# Patient Record
Sex: FEMALE | URBAN - METROPOLITAN AREA
[De-identification: names, ages, dates, MRNs, and addresses within clinical notes are randomized per-mention and may not be internally consistent; named-entity substitution may affect disease eponyms.]

---

## 2018-07-19 ENCOUNTER — OFFICE VISIT (OUTPATIENT)
Dept: URBAN - METROPOLITAN AREA CLINIC 40 | Facility: CLINIC | Age: 73
End: 2018-07-19
Payer: COMMERCIAL

## 2018-07-19 DIAGNOSIS — M06.09 RA WITHOUT RHEUMATOID FACTOR OF MULTIPLE SITES: Primary | ICD-10-CM

## 2018-07-19 PROCEDURE — 92014 COMPRE OPH EXAM EST PT 1/>: CPT | Performed by: OPTOMETRIST

## 2018-07-19 ASSESSMENT — INTRAOCULAR PRESSURE
OD: 14
OS: 15

## 2018-07-19 NOTE — IMPRESSION/PLAN
Impression: Age-related nuclear cataract, bilateral: H25.13. Plan: Cataracts account for the patient's complaints. No treatment currently recommended. The patient will monitor vision changes and contact us with any decrease in vision. Schedule refraction.

## 2018-07-19 NOTE — IMPRESSION/PLAN
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 Plan: Discussed diagnosis in detail with patient. Will continue to observe condition and or symptoms. No evidence of plaquenil toxicity OU. Schedule refraction.

## 2019-03-15 ENCOUNTER — OFFICE VISIT (OUTPATIENT)
Dept: URBAN - METROPOLITAN AREA CLINIC 29 | Facility: CLINIC | Age: 74
End: 2019-03-15
Payer: MEDICARE

## 2019-03-15 DIAGNOSIS — E11.9 TYPE 2 DIABETES MELLITUS W/O COMPLICATION: ICD-10-CM

## 2019-03-15 DIAGNOSIS — T37.2X5S ADVERSE EFFECT OF ANTIMALARIAL, SEQUELA: Primary | ICD-10-CM

## 2019-03-15 PROCEDURE — 92014 COMPRE OPH EXAM EST PT 1/>: CPT | Performed by: OPTOMETRIST

## 2019-03-15 PROCEDURE — 92134 CPTRZ OPH DX IMG PST SGM RTA: CPT | Performed by: OPTOMETRIST

## 2019-03-15 ASSESSMENT — INTRAOCULAR PRESSURE
OS: 12
OD: 13

## 2019-03-15 NOTE — IMPRESSION/PLAN
Impression: Adverse effect of antimalarial, sequela: T37.2x5S. No Plaquenil related eye Disease OU Plan: Discussed diagnosis in detail with patient. No treatment is required at this time. Use of Red Amsler grid was explained. Reassured patient of current condition and treatment. Will continue to observe condition and or symptoms.

## 2019-03-15 NOTE — IMPRESSION/PLAN
Impression: Type 2 diabetes mellitus w/o complication: H71.5. No Diabetic related eye Disease OU Plan: Discussed diagnosis in detail with patient. Emphasized blood sugar control. Will continue to observe condition and or symptoms. Call if symptoms occur.

## 2019-05-17 ENCOUNTER — OFFICE VISIT (OUTPATIENT)
Dept: URBAN - METROPOLITAN AREA CLINIC 29 | Facility: CLINIC | Age: 74
End: 2019-05-17
Payer: MEDICARE

## 2019-05-17 PROCEDURE — 65778 COVER EYE W/MEMBRANE: CPT | Performed by: OPTOMETRIST

## 2019-05-17 PROCEDURE — 99213 OFFICE O/P EST LOW 20 MIN: CPT | Performed by: OPTOMETRIST

## 2019-05-17 RX ORDER — OFLOXACIN 3 MG/ML
0.3 % SOLUTION/ DROPS OPHTHALMIC
Qty: 1 | Refills: 0 | Status: INACTIVE
Start: 2019-05-17 | End: 2019-05-21

## 2019-05-17 RX ORDER — CYCLOSPORINE 0.5 MG/ML
0.05 % EMULSION OPHTHALMIC
Qty: 90 | Refills: 3 | Status: INACTIVE
Start: 2019-05-17 | End: 2019-05-20

## 2019-05-17 NOTE — IMPRESSION/PLAN
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 Plan: Discussed diagnosis in detail with patient. Discussed treatment options with patient. Discussed risks and benefits and patient understands. Discussed risks of progression. Severity of condition require New medication(s) and application of new Amniotic bandage CL today.

## 2019-05-21 ENCOUNTER — OFFICE VISIT (OUTPATIENT)
Dept: URBAN - METROPOLITAN AREA CLINIC 29 | Facility: CLINIC | Age: 74
End: 2019-05-21
Payer: MEDICARE

## 2019-05-21 PROCEDURE — 99213 OFFICE O/P EST LOW 20 MIN: CPT | Performed by: OPTOMETRIST

## 2019-05-21 RX ORDER — CYCLOSPORINE 0.5 MG/ML
0.05 % EMULSION OPHTHALMIC
Qty: 180 | Refills: 3 | Status: ACTIVE
Start: 2019-05-21

## 2019-05-21 NOTE — IMPRESSION/PLAN
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 OS. Condition: improving. Plan: Discussed diagnosis in detail with patient. Discussed treatment options with patient. Patient instructed to use artificial tears as needed. STELLA HS OU. Will continue to observe condition and or symptoms. New medication(s) Rx given today. Removed CL OS.

## 2019-05-22 ENCOUNTER — OFFICE VISIT (OUTPATIENT)
Dept: URBAN - METROPOLITAN AREA CLINIC 29 | Facility: CLINIC | Age: 74
End: 2019-05-22
Payer: MEDICARE

## 2019-05-22 PROCEDURE — 92071 CONTACT LENS FITTING FOR TX: CPT | Performed by: OPTOMETRIST

## 2019-05-22 PROCEDURE — 99213 OFFICE O/P EST LOW 20 MIN: CPT | Performed by: OPTOMETRIST

## 2019-05-22 RX ORDER — OFLOXACIN 3 MG/ML
0.3 % SOLUTION/ DROPS OPHTHALMIC
Qty: 1 | Refills: 0 | Status: INACTIVE
Start: 2019-05-22 | End: 2019-06-05

## 2019-05-22 NOTE — IMPRESSION/PLAN
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 Condition: established, worsening. Plan: Discussed diagnosis in detail with patient. Discussed treatment options with patient. Discussed risks and benefits and patient understands. Discussed risks of progression. New medication(s) Rx given today. Patient given new bandage CL today.

## 2019-06-05 ENCOUNTER — OFFICE VISIT (OUTPATIENT)
Dept: URBAN - METROPOLITAN AREA CLINIC 29 | Facility: CLINIC | Age: 74
End: 2019-06-05
Payer: MEDICARE

## 2019-06-05 PROCEDURE — 99213 OFFICE O/P EST LOW 20 MIN: CPT | Performed by: OPTOMETRIST

## 2019-06-05 NOTE — IMPRESSION/PLAN
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 Condition: improving. Plan: Discussed diagnosis in detail with patient. Discussed treatment options with patient. Patient instructed to use artificial tears as needed. STELLA HS OU. Will continue to observe condition and or symptoms. Continue using current medication(s). Spine appears normal, range of motion is not limited, no muscle or joint tenderness

## 2020-08-26 ENCOUNTER — NEW PATIENT (OUTPATIENT)
Dept: URBAN - METROPOLITAN AREA CLINIC 10 | Facility: CLINIC | Age: 75
End: 2020-08-26
Payer: MEDICARE

## 2020-08-26 DIAGNOSIS — H25.13 AGE-RELATED NUCLEAR CATARACT, BILATERAL: ICD-10-CM

## 2020-08-26 PROCEDURE — 92002 INTRM OPH EXAM NEW PATIENT: CPT | Performed by: OPHTHALMOLOGY

## 2020-08-26 PROCEDURE — 92071 CONTACT LENS FITTING FOR TX: CPT | Performed by: OPHTHALMOLOGY

## 2020-08-26 PROCEDURE — 68761 CLOSE TEAR DUCT OPENING: CPT | Performed by: OPHTHALMOLOGY

## 2020-10-08 ENCOUNTER — FOLLOW UP ESTABLISHED (OUTPATIENT)
Dept: URBAN - METROPOLITAN AREA CLINIC 24 | Facility: CLINIC | Age: 75
End: 2020-10-08
Payer: MEDICARE

## 2020-10-08 PROCEDURE — 92012 INTRM OPH EXAM EST PATIENT: CPT | Performed by: OPHTHALMOLOGY

## 2020-10-08 PROCEDURE — 92071 CONTACT LENS FITTING FOR TX: CPT | Performed by: OPHTHALMOLOGY

## 2020-11-05 ENCOUNTER — FOLLOW UP ESTABLISHED (OUTPATIENT)
Dept: URBAN - METROPOLITAN AREA CLINIC 24 | Facility: CLINIC | Age: 75
End: 2020-11-05
Payer: MEDICARE

## 2020-11-05 PROCEDURE — 68761 CLOSE TEAR DUCT OPENING: CPT | Performed by: OPHTHALMOLOGY

## 2020-11-05 PROCEDURE — 92012 INTRM OPH EXAM EST PATIENT: CPT | Performed by: OPHTHALMOLOGY

## 2020-11-05 PROCEDURE — 92071 CONTACT LENS FITTING FOR TX: CPT | Performed by: OPHTHALMOLOGY

## 2020-12-17 ENCOUNTER — FOLLOW UP ESTABLISHED (OUTPATIENT)
Dept: URBAN - METROPOLITAN AREA CLINIC 24 | Facility: CLINIC | Age: 75
End: 2020-12-17
Payer: MEDICARE

## 2020-12-17 PROCEDURE — 92012 INTRM OPH EXAM EST PATIENT: CPT | Performed by: OPHTHALMOLOGY

## 2020-12-17 ASSESSMENT — INTRAOCULAR PRESSURE
OS: 7
OD: 6

## 2021-03-11 ENCOUNTER — FOLLOW UP ESTABLISHED (OUTPATIENT)
Dept: URBAN - METROPOLITAN AREA CLINIC 24 | Facility: CLINIC | Age: 76
End: 2021-03-11
Payer: OTHER MISCELLANEOUS

## 2021-03-11 PROCEDURE — 99213 OFFICE O/P EST LOW 20 MIN: CPT | Performed by: OPHTHALMOLOGY

## 2021-07-15 ENCOUNTER — OFFICE VISIT (OUTPATIENT)
Dept: URBAN - METROPOLITAN AREA CLINIC 24 | Facility: CLINIC | Age: 76
End: 2021-07-15
Payer: OTHER MISCELLANEOUS

## 2021-07-15 PROCEDURE — 99213 OFFICE O/P EST LOW 20 MIN: CPT | Performed by: OPHTHALMOLOGY

## 2021-07-15 NOTE — IMPRESSION/PLAN
Impression: Age-related nuclear cataract, bilateral Plan: Visually significant, given recent abrasion will hold off on cat sx for now. 
Will address nv if still quiet

## 2021-07-15 NOTE — IMPRESSION/PLAN
Impression: Filamentary keratitis, left eye
- h/o RA, on infusions of Orencia - PP IN place MAGGIE and LLL Plan: H/o filamentary keratitis OU, s/p amniotic membrane OS in 2019, was unable to tolerate. H/o Prokera on the past per pt by DR. Jenaro Hinds at [facility]. Has not need BCL patching or restasis since 11/2020. Was doing well with SERUM TEARS, however was told by Dr. Jean Certain to stop serum tears and use thera tears; subsequently developed filaments and an abrasion. BCL was placed by her daughter. BCL removed today. Instructed to resume serum tears QID OS. Cont PFATs q2hr w/a. May consider repeat Prokera in the future.

## 2022-04-01 ENCOUNTER — OFFICE VISIT (OUTPATIENT)
Dept: URBAN - METROPOLITAN AREA CLINIC 24 | Facility: CLINIC | Age: 77
End: 2022-04-01
Payer: OTHER MISCELLANEOUS

## 2022-04-01 DIAGNOSIS — H16.122 FILAMENTARY KERATITIS, LEFT EYE: ICD-10-CM

## 2022-04-01 PROCEDURE — 99213 OFFICE O/P EST LOW 20 MIN: CPT | Performed by: OPHTHALMOLOGY

## 2022-04-01 NOTE — IMPRESSION/PLAN
Impression: Filamentary keratitis, left eye
- h/o RA, on infusions of Orencia - PP IN place MAGGIE and LLL
- H/o filamentary keratitis OU, s/p amniotic membrane OS in 2019, was unable to tolerate. H/o Prokera on the past per pt by DR. Markos Villagran at [facility]. Plan: Was bothersome, used therapeutic BCL with relief. Not using serum tears QID OS. Cont PFATs q2hr w/a. Start Cequa OS BID, samples given. Could not afford restasis.

## 2022-04-01 NOTE — IMPRESSION/PLAN
Impression: Age-related nuclear cataract, bilateral Plan: Discussed cataract diagnosis with the patient. Risks and benefits of surgical treatment were discussed and understood. Patient elects surgical treatment. Patient is not a candidate for specialty IOLs given **corneal scars/severe KCS**. Pt understands that vision will be limited by previous ocular disease and will need to wear glasses/CTLs for their best vision. Patient desires surgery OU,  OD First.  Standard (or Toric if she is a candidate and if she desires) IOL OU,  AIM= Distance OU, DEXYCU OU ok, NO LENSX, ORA and AMP ok if pt desires.

## 2022-05-24 ENCOUNTER — TESTING ONLY (OUTPATIENT)
Dept: URBAN - METROPOLITAN AREA CLINIC 30 | Facility: CLINIC | Age: 77
End: 2022-05-24
Payer: OTHER MISCELLANEOUS

## 2022-05-24 DIAGNOSIS — Z01.818 ENCOUNTER FOR OTHER PREPROCEDURAL EXAMINATION: Primary | ICD-10-CM

## 2022-05-24 DIAGNOSIS — H25.13 AGE-RELATED NUCLEAR CATARACT, BILATERAL: ICD-10-CM

## 2022-05-24 PROCEDURE — 99203 OFFICE O/P NEW LOW 30 MIN: CPT | Performed by: PHYSICIAN ASSISTANT

## 2022-05-24 RX ORDER — LOSARTAN POTASSIUM 50 MG/1
50 MG TABLET, FILM COATED ORAL
Qty: 0 | Refills: 0 | Status: ACTIVE
Start: 2022-05-24

## 2022-05-24 RX ORDER — LEVOTHYROXINE SODIUM 175 UG/1
TABLET ORAL
Qty: 0 | Refills: 0 | Status: ACTIVE
Start: 2022-05-24

## 2022-05-24 RX ORDER — HYDROXYCHLOROQUINE SULFATE 200 MG/1
200 MG TABLET ORAL
Qty: 0 | Refills: 0 | Status: ACTIVE
Start: 2022-05-24

## 2022-05-24 ASSESSMENT — PACHYMETRY
OS: 3.06
OS: 23.89
OD: 23.67
OD: 2.95

## 2022-06-09 ENCOUNTER — PRE-OPERATIVE VISIT (OUTPATIENT)
Dept: URBAN - METROPOLITAN AREA CLINIC 24 | Facility: CLINIC | Age: 77
End: 2022-06-09
Payer: OTHER MISCELLANEOUS

## 2022-06-09 DIAGNOSIS — H17.12 CENTRAL CORNEAL OPACITY, LEFT EYE: ICD-10-CM

## 2022-06-09 DIAGNOSIS — H25.13 AGE-RELATED NUCLEAR CATARACT, BILATERAL: Primary | ICD-10-CM

## 2022-06-09 DIAGNOSIS — H16.122 FILAMENTARY KERATITIS, LEFT EYE: ICD-10-CM

## 2022-06-09 PROCEDURE — 99214 OFFICE O/P EST MOD 30 MIN: CPT | Performed by: OPHTHALMOLOGY

## 2022-06-09 ASSESSMENT — INTRAOCULAR PRESSURE
OS: 13
OD: 10

## 2022-06-09 NOTE — IMPRESSION/PLAN
Impression: Filamentary keratitis, left eye
- h/o RA, on infusions of Orencia - PP IN place MAGGIE and LLL
- H/o filamentary keratitis OU, s/p amniotic membrane OS in 2019, was unable to tolerate. H/o Prokera on the past per pt by DR. Starr Gandhi at [facility]. Failed restasis and serum tears Plan: Only relief in the past ois with BCL patching OS, but since using Cequa no further filaments. Cont PFATs q2hr w/a. DOING VERY WELL ON CEQUA! THIS IS THE ONLY MEDICATION THAT HAS WORKED TO CONTROL HER FILAMENTRAY KERATITIS. Cont Cequa OU BID, may dispense samples to pt if she calls if not covered.

## 2022-06-16 ENCOUNTER — SURGERY (OUTPATIENT)
Dept: URBAN - METROPOLITAN AREA SURGERY 12 | Facility: SURGERY | Age: 77
End: 2022-06-16
Payer: OTHER MISCELLANEOUS

## 2022-06-16 DIAGNOSIS — H25.13 AGE-RELATED NUCLEAR CATARACT, BILATERAL: Primary | ICD-10-CM

## 2022-06-16 PROCEDURE — 66984 XCAPSL CTRC RMVL W/O ECP: CPT | Performed by: OPHTHALMOLOGY

## 2022-06-16 RX ORDER — CYCLOSPORINE 0 G/ML
0.09 % SOLUTION/ DROPS OPHTHALMIC; TOPICAL
Qty: 60 | Refills: 1 | Status: ACTIVE
Start: 2022-06-16

## 2022-06-17 ENCOUNTER — POST-OPERATIVE VISIT (OUTPATIENT)
Dept: URBAN - METROPOLITAN AREA CLINIC 30 | Facility: CLINIC | Age: 77
End: 2022-06-17
Payer: OTHER MISCELLANEOUS

## 2022-06-17 DIAGNOSIS — Z48.810 ENCOUNTER FOR SURGICAL AFTERCARE FOLLOWING SURGERY ON A SENSE ORGAN: Primary | ICD-10-CM

## 2022-06-17 PROCEDURE — 99024 POSTOP FOLLOW-UP VISIT: CPT | Performed by: OPTOMETRIST

## 2022-06-17 ASSESSMENT — INTRAOCULAR PRESSURE: OD: 14

## 2022-06-17 NOTE — IMPRESSION/PLAN
Impression: S/P Cataract Extraction by phacoemulsification with IOL placement OD - 1 Day. Encounter for surgical aftercare following surgery on a sense organ  Z48.810. Plan: -0.25 aim. Use ATs TID-QID OU. 
pt worried about getting an infection, reassured today
notes h/o very dry eyes RTC as scheduled for 1 week PO.

## 2022-08-31 ENCOUNTER — POST-OPERATIVE VISIT (OUTPATIENT)
Dept: URBAN - METROPOLITAN AREA CLINIC 30 | Facility: CLINIC | Age: 77
End: 2022-08-31
Payer: OTHER MISCELLANEOUS

## 2022-08-31 DIAGNOSIS — Z48.810 ENCOUNTER FOR SURGICAL AFTERCARE FOLLOWING SURGERY ON A SENSE ORGAN: Primary | ICD-10-CM

## 2022-08-31 PROCEDURE — 99024 POSTOP FOLLOW-UP VISIT: CPT | Performed by: OPTOMETRIST

## 2022-08-31 ASSESSMENT — INTRAOCULAR PRESSURE
OS: 12
OD: 10

## 2022-08-31 ASSESSMENT — KERATOMETRY
OS: 42.99
OD: 44.66

## 2022-08-31 ASSESSMENT — VISUAL ACUITY
OD: 20/30
OS: 20/60

## 2022-08-31 NOTE — IMPRESSION/PLAN
Impression:  Encounter for surgical aftercare following surgery on a sense organ  Z48.810. Plan: Using Cequa BID OS per Dr. Huseyin Chin this is only thing that worked for [facility] for filamentary keratitis. Using Restasis BID OD c ATs prn. Patient would like to proceed c cataract surgery OS. CAT EVAL next visit, schedule ASCAN and HP on same day due to transportation constraints. Janny Bruno will contact patient to schedule. Request Dr. Huseyin Chin for surgery.

## 2022-10-11 ENCOUNTER — OFFICE VISIT (OUTPATIENT)
Dept: URBAN - METROPOLITAN AREA CLINIC 30 | Facility: CLINIC | Age: 77
End: 2022-10-11
Payer: OTHER MISCELLANEOUS

## 2022-10-11 DIAGNOSIS — Z96.1 PRESENCE OF PSEUDOPHAKIA: ICD-10-CM

## 2022-10-11 DIAGNOSIS — Z01.818 ENCOUNTER FOR OTHER PREPROCEDURAL EXAMINATION: Primary | ICD-10-CM

## 2022-10-11 DIAGNOSIS — H16.122 FILAMENTARY KERATITIS, LEFT EYE: ICD-10-CM

## 2022-10-11 DIAGNOSIS — H43.812 VITREOUS DEGENERATION, LEFT EYE: ICD-10-CM

## 2022-10-11 DIAGNOSIS — H25.812 COMBINED FORMS OF AGE-RELATED CATARACT, LEFT EYE: Primary | ICD-10-CM

## 2022-10-11 DIAGNOSIS — H17.12 CENTRAL CORNEAL OPACITY, LEFT EYE: ICD-10-CM

## 2022-10-11 DIAGNOSIS — H25.13 AGE-RELATED NUCLEAR CATARACT, BILATERAL: Primary | ICD-10-CM

## 2022-10-11 PROCEDURE — 99213 OFFICE O/P EST LOW 20 MIN: CPT | Performed by: PHYSICIAN ASSISTANT

## 2022-10-11 PROCEDURE — 99214 OFFICE O/P EST MOD 30 MIN: CPT | Performed by: OPTOMETRIST

## 2022-10-11 RX ORDER — ABATACEPT 250 MG/15ML
250 MG INJECTION, POWDER, LYOPHILIZED, FOR SOLUTION INTRAVENOUS
Qty: 0 | Refills: 0 | Status: ACTIVE
Start: 2022-10-11

## 2022-10-11 ASSESSMENT — VISUAL ACUITY
OS: 20/150
OD: 20/30

## 2022-10-11 ASSESSMENT — PACHYMETRY
OS: 23.80
OS: 3.00
OD: 4.63
OD: 23.57

## 2022-10-11 ASSESSMENT — INTRAOCULAR PRESSURE
OS: 10
OS: 10
OD: 10
OS: 10
OD: 10
OD: 10

## 2022-10-11 ASSESSMENT — KERATOMETRY
OD: 44.23
OS: 43.22

## 2022-10-11 NOTE — IMPRESSION/PLAN
Impression: Central corneal opacity, left eye: H17.12. Plan: Limits vision, will limit PO BCVA, pt understands.

## 2022-10-11 NOTE — IMPRESSION/PLAN
Impression: Combined forms of age-related cataract, left eye: H25.812. Plan:  Discussed cataracts with patient. Discussed treatment options. Surgical treatment is recommended. Surgical risks and benefits discussed. Patient elects surgical treatment. Recommend surgery OS. Patient is candidate/interested in standard lens, LenSx, ORA. Aim OS: plano. Outcome of surgery limitations include:  Filamentary keratitis, left eye, Central corneal opacity, left eye, PVD OS.

## 2022-10-11 NOTE — IMPRESSION/PLAN
Impression: Filamentary keratitis, left eye
- h/o RA, on infusions of Orencia - PP IN place MAGGIE and LLL
- H/o filamentary keratitis OU, s/p amniotic membrane OS in 2019, was unable to tolerate. H/o Prokera on the past per pt by DR. Shelley Martin at [facility]. Failed restasis and serum tears Plan: Only relief in the past ois with BCL patching OS, but since using Cequa no further filaments. Cont PFATs q2hr w/a. DOING VERY WELL ON CEQUA! THIS IS THE ONLY MEDICATION THAT HAS WORKED TO CONTROL HER FILAMENTARY KERATITIS. Cont Cequa OU BID, may dispense samples to pt if she calls if not covered.

## 2022-10-11 NOTE — IMPRESSION/PLAN
Impression: Vitreous degeneration, left eye: H43.812. Plan: Pt educ on findings. Retinas flat and attached OU. Reviewed signs and symptoms of RD and to call asap if occurs.

## 2022-11-04 NOTE — IMPRESSION/PLAN
Impression: S/P Cataract Extraction by phacoemulsification with IOL placement complex with trypan blue OS - 1 Day. Presence of intraocular lens  Z96.1. Plan: -0.25 OS. Pt notes problem with removing tape post surgery. notes she had pain during surgery Using Thera Tears only OD. Using Tanda Line noting she is using [place] multiple times each day. Discussed that [place] is intended to be used BID OU- recommend she use in OD as well.  

rtc for final PO 1 month

## 2022-12-02 NOTE — IMPRESSION/PLAN
Impression: S/P Cataract Extraction by phacoemulsification with IOL placement complex with trypan blue OS - 29 Days. Encounter for surgical aftercare following surgery on a sense organ  Z48.810. Plan: Using Cequa-needs to continue at least BID OS
pt notes she felt surgery OS did not go as well
reminded pt that K scar limits OS but with correction gets to 20/30
spec rx issued 6 months CE